# Patient Record
Sex: MALE | Race: WHITE | URBAN - METROPOLITAN AREA
[De-identification: names, ages, dates, MRNs, and addresses within clinical notes are randomized per-mention and may not be internally consistent; named-entity substitution may affect disease eponyms.]

---

## 2017-02-07 ENCOUNTER — TRANSFERRED RECORDS (OUTPATIENT)
Dept: HEALTH INFORMATION MANAGEMENT | Facility: CLINIC | Age: 62
End: 2017-02-07

## 2017-03-22 DIAGNOSIS — I10 BENIGN ESSENTIAL HYPERTENSION: ICD-10-CM

## 2017-03-22 RX ORDER — LISINOPRIL 5 MG/1
TABLET ORAL
Qty: 90 TABLET | Refills: 0 | Status: SHIPPED | OUTPATIENT
Start: 2017-03-22 | End: 2017-06-30

## 2017-03-22 NOTE — TELEPHONE ENCOUNTER
lisinopril (PRINIVIL/ZESTRIL) 5 MG tablet        Last Written Prescription Date: 4/7/2016  Last Fill Quantity: 90, # refills: 3  Last Office Visit with G, P or [facility] prescribing provider: 11/17/2016       Potassium   Date Value Ref Range Status   03/31/2016 4.1 3.4 - 5.3 mmol/L Final     Creatinine   Date Value Ref Range Status   03/31/2016 0.85 0.66 - 1.25 mg/dL Final     BP Readings from Last 3 Encounters:   11/17/16 136/58   04/07/16 133/54   01/15/15 128/71

## 2017-04-13 ENCOUNTER — TRANSFERRED RECORDS (OUTPATIENT)
Dept: HEALTH INFORMATION MANAGEMENT | Facility: CLINIC | Age: 62
End: 2017-04-13

## 2017-04-21 DIAGNOSIS — C61 PROSTATE CANCER (H): ICD-10-CM

## 2017-04-21 RX ORDER — TAMSULOSIN HYDROCHLORIDE 0.4 MG/1
CAPSULE ORAL
Qty: 30 CAPSULE | Refills: 5 | Status: SHIPPED | OUTPATIENT
Start: 2017-04-21 | End: 2017-08-17

## 2017-04-21 NOTE — TELEPHONE ENCOUNTER
Prescription approved per [facility] Refill Protocol.    Mary Mohan RN

## 2017-04-21 NOTE — TELEPHONE ENCOUNTER
Flomax 0.4 mg          Last Written Prescription Date: 4/7/16  Last Fill Quantity: 90, # refills: 3    Last Office Visit with G, P or [facility] prescribing provider:  11/17/16   Future Office Visit:      BP Readings from Last 3 Encounters:   11/17/16 136/58   04/07/16 133/54   01/15/15 128/71

## 2017-06-30 DIAGNOSIS — I10 BENIGN ESSENTIAL HYPERTENSION: ICD-10-CM

## 2017-06-30 NOTE — TELEPHONE ENCOUNTER
Please schedule Physical. Was not scheduled with last note 4/12/17.  TC left message for pt to schedule.  Route back to triage when scheduled.  Kristie Kam RN

## 2017-06-30 NOTE — TELEPHONE ENCOUNTER
Pending Prescriptions:                       Disp   Refills    lisinopril (PRINIVIL/ZESTRIL) 5 MG tablet*90 tab*0            Sig: TAKE ONE TABLET BY MOUTH ONE TIME DAILY. FASTING           APPOINTMENT FOR FURTHER REFILLS NEEDED.          Last Written Prescription Date: 3/22/17  Last Fill Quantity: 90, # refills: 0  Last Office Visit with [facility] or [facility] prescribing provider: 11/17/16       Potassium   Date Value Ref Range Status   03/31/2016 4.1 3.4 - 5.3 mmol/L Final     Creatinine   Date Value Ref Range Status   03/31/2016 0.85 0.66 - 1.25 mg/dL Final     BP Readings from Last 3 Encounters:   11/17/16 136/58   04/07/16 133/54   01/15/15 128/71

## 2017-07-13 RX ORDER — LISINOPRIL 5 MG/1
TABLET ORAL
Qty: 90 TABLET | Refills: 0 | Status: SHIPPED | OUTPATIENT
Start: 2017-07-13 | End: 2017-08-17

## 2017-07-13 NOTE — TELEPHONE ENCOUNTER
Patient called and is now scheduled for his annual Physical on 8-17  Please fill his Medication request    Thank you

## 2017-08-16 NOTE — PROGRESS NOTES
SUBJECTIVE:   CC: Chai Yuen is an 62 year old male who presents for preventative health visit.     The patient is doing well and works out very reg, biking.  He has reg follow up for active surveilance for cap and no issues. Dr Bernardo and Dr. Pugh, seeing the latter soon and psa done there.  He has not been checking blood pressure reg.  Had fall months ago and hurt left shoulder, now mostly resolved, but in bed on left side can feel it, otherwise no issues.  NO other c/o.    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      Healthy Habits:    Do you get at least three servings of calcium containing foods daily (dairy, green leafy vegetables, etc.)no yes    Amount of exercise or daily activities, outside of work: 6 day(s) per week    Problems taking medications regularly No    Medication side effects: No    Have you had an eye exam in the past two years? yes    Do you see a dentist twice per year? yes    Do you have sleep apnea, excessive snoring or daytime drowsiness?no              Today's PHQ-2 Score: PHQ-2 ( 1999 [employer]) 4/7/2016 1/15/2015   Q1: Little interest or pleasure in doing things 0 0   Q2: Feeling down, depressed or hopeless 0 0   PHQ-2 Score 0 0         Abuse: Current or Past(Physical, Sexual or Emotional)- No  Do you feel safe in your environment - Yes  Social History   Substance Use Topics     Smoking status: Never Smoker     Smokeless tobacco: Never Used     Alcohol use 4.2 oz/week     7 Standard drinks or equivalent per week     The patient does not drink >3 drinks per day nor >7 drinks per week.                Past Medical History:      Past Medical History:   Diagnosis Date     Anxiety 1/15     Aortic insufficiency     Moderate by echo 2013, fu 1 year, fu 4/16 no change     Ascending aorta dilatation (H)     fu stable 2013, fu 4/16 stable     Erectile dysfunction      GERD (gastroesophageal reflux disease)      HTN (hypertension)      [place] of [place] 4/[place]    [place]      Prostate cancer (H) 2010    Dr. Pugh and Dr. Bernardo, fu bx done 12/14 and stable     Shingles              Past Surgical History:      Past Surgical History:   Procedure Laterality Date     APPENDECTOMY  13             Social History:     Social History     Social History     Marital status:      Spouse name: N/A     Number of children: 3     Years of education: N/A     Occupational History      [employer]     Social History Main Topics     Smoking status: Never Smoker     Smokeless tobacco: Never Used     Alcohol use 4.2 oz/week     7 Standard drinks or equivalent per week     Drug use: No     Sexual activity: Yes     Partners: Female     Other Topics Concern     Not on file     Social History Narrative             Family History:   reviewed         Allergies:   No Known Allergies          Medications:     Current Outpatient Prescriptions   Medication Sig Dispense Refill     lisinopril (PRINIVIL/ZESTRIL) 5 MG tablet Take 1 tablet (5 mg) by mouth daily 90 tablet 3     tamsulosin (FLOMAX) 0.4 MG capsule Take 1 capsule (0.4 mg) by mouth daily 90 capsule 3     dutasteride (AVODART) 0.5 MG capsule Take 1 capsule (0.5 mg) by mouth daily 90 capsule 3     tadalafil (CIALIS) 20 MG tablet Take 0.5-1 tablets (10-20 mg) by mouth daily as needed for erectile dysfunction Never use with nitroglycerin, terazosin or doxazosin. 12 tablet 11     [DISCONTINUED] lisinopril (PRINIVIL/ZESTRIL) 5 MG tablet TAKE ONE TABLET BY MOUTH ONE TIME DAILY. FASTING APPOINTMENT FOR FURTHER REFILLS NEEDED. 90 tablet 0     [DISCONTINUED] tadalafil (CIALIS) 20 MG tablet Take 0.5-1 tablets (10-20 mg) by mouth daily as needed for erectile dysfunction Never use with nitroglycerin, terazosin or doxazosin. 12 tablet 11               Review of Systems:   The 10 point Review of Systems is negative other than noted in the HPI           Physical Exam:   Blood pressure 141/66, pulse 69, temperature 98.2  F (36.8  C), temperature source Oral,  "height 5' 11\" (1.803 m), weight 178 lb (80.7 kg), SpO2 96 %.    Exam:  Constitutional: healthy appearing, alert and in no distress  Heent: Normocephalic. Head without obvious masses or lesions. PERRLDC, EOMI. Mouth exam within normal limits: tongue, mucous membranes, posterior pharynx all normal, no lesions or abnormalities seen.  Tm's and canals within normal limits bilaterally. Neck supple, no nuchal rigidity or masses. No supraclavicular, or cervical adenopathy. Thyroid symmetric, no masses.  Cardiovascular: Regular rate and rhythm, no murmer, rub or gallops.  JVP not elevated, no edema.  Carotids within normal limits bilaterally, no bruits.  Respiratory: Normal respiratory effort.  Lungs clear, normal flow, no wheezing or crackles.  Breasts: Normal bilaterally.  No masses or lesions.  Nipples within normal limites.  No axillary lesions or nodes.  Gastrointestinal: Normal active bowel sounds.   Soft, not tender, no masses, guarding or rebound.  No hepatosplenomegaly.   Genitourinary: Rectal not done  Musculoskeletal: extremities normal, no gross deformities noted.  Skin: no suspicious lesions or rashes   Neurologic: Mental status within normal limits.  Speech fluent.  No gross motor abnormalities and gait intact.  Psychiatric: mentation appears normal and affect normal.         Data:   Labs patient did not want any today        Assessment:   1. Normal cpx  2. Cap, follow up onc and uro  3. Dilated asc aorta, to get follow up echo, needs better blood pressure control, he will check it and if not staying below 135/85 let me know, could consider bblocker then  4. Aortic insuff, follow up echo to be done  5. Hypertension, as noted above  6. E.d  7. hcm         Plan:   Up to date immunizations, colon  Exercise, diet  No weight lifting or bearing down  Echo  Monitor blood pressure and if not under 135/85 call      Mario Washington M.D.        "

## 2017-08-16 NOTE — PATIENT INSTRUCTIONS
Monitor your blood pressure and if it is not staying on average below 135/80 let me know.    Mario Washington M.D.            Preventive Health Recommendations  Male Ages 50 - 64    Yearly exam:             See your health care provider every year in order to  o   Review health changes.   o   Discuss preventive care.    o   Review your medicines if your doctor has prescribed any.     Have a cholesterol test every 5 years, or more frequently if you are at risk for high cholesterol/heart disease.     Have a diabetes test (fasting glucose) every three years. If you are at risk for diabetes, you should have this test more often.     Have a colonoscopy at age 50, or have a yearly FIT test (stool test). These exams will check for colon cancer.      Talk with your health care provider about whether or not a prostate cancer screening test (PSA) is right for you.    You should be tested each year for STDs (sexually transmitted diseases), if you re at risk.     Shots: Get a flu shot each year. Get a tetanus shot every 10 years.     Nutrition:    Eat at least 5 servings of fruits and vegetables daily.     Eat whole-grain bread, whole-wheat pasta and brown rice instead of white grains and rice.     Talk to your provider about Calcium and Vitamin D.     Lifestyle    Exercise for at least 150 minutes a week (30 minutes a day, 5 days a week). This will help you control your weight and prevent disease.     Limit alcohol to one drink per day.     No smoking.     Wear sunscreen to prevent skin cancer.     See your dentist every six months for an exam and cleaning.     See your eye doctor every 1 to 2 years.

## 2017-08-17 ENCOUNTER — OFFICE VISIT (OUTPATIENT)
Dept: FAMILY MEDICINE | Facility: CLINIC | Age: 62
End: 2017-08-17
Payer: COMMERCIAL

## 2017-08-17 VITALS
BODY MASS INDEX: 24.92 KG/M2 | SYSTOLIC BLOOD PRESSURE: 141 MMHG | HEIGHT: 71 IN | TEMPERATURE: 98.2 F | OXYGEN SATURATION: 96 % | WEIGHT: 178 LBS | DIASTOLIC BLOOD PRESSURE: 66 MMHG | HEART RATE: 69 BPM

## 2017-08-17 DIAGNOSIS — I10 BENIGN ESSENTIAL HYPERTENSION: ICD-10-CM

## 2017-08-17 DIAGNOSIS — C61 PROSTATE CANCER (H): ICD-10-CM

## 2017-08-17 DIAGNOSIS — I77.810 ASCENDING AORTA DILATATION (H): ICD-10-CM

## 2017-08-17 DIAGNOSIS — I35.1 NONRHEUMATIC AORTIC VALVE INSUFFICIENCY: ICD-10-CM

## 2017-08-17 DIAGNOSIS — N52.9 ERECTILE DYSFUNCTION, UNSPECIFIED ERECTILE DYSFUNCTION TYPE: ICD-10-CM

## 2017-08-17 DIAGNOSIS — Z00.00 ROUTINE GENERAL MEDICAL EXAMINATION AT A HEALTH CARE FACILITY: Primary | ICD-10-CM

## 2017-08-17 PROCEDURE — 99396 PREV VISIT EST AGE 40-64: CPT | Performed by: INTERNAL MEDICINE

## 2017-08-17 RX ORDER — DUTASTERIDE 0.5 MG/1
0.5 CAPSULE, LIQUID FILLED ORAL DAILY
Qty: 90 CAPSULE | Refills: 3 | Status: SHIPPED | OUTPATIENT
Start: 2017-08-17

## 2017-08-17 RX ORDER — TAMSULOSIN HYDROCHLORIDE 0.4 MG/1
0.4 CAPSULE ORAL DAILY
Qty: 90 CAPSULE | Refills: 3 | Status: SHIPPED | OUTPATIENT
Start: 2017-08-17 | End: 2017-12-07

## 2017-08-17 RX ORDER — LISINOPRIL 5 MG/1
5 TABLET ORAL DAILY
Qty: 90 TABLET | Refills: 3 | Status: SHIPPED | OUTPATIENT
Start: 2017-08-17 | End: 2017-12-07

## 2017-08-17 RX ORDER — TADALAFIL 20 MG/1
10-20 TABLET ORAL DAILY PRN
Qty: 12 TABLET | Refills: 11 | Status: SHIPPED | OUTPATIENT
Start: 2017-08-17

## 2017-08-17 NOTE — MR AVS SNAPSHOT
After Visit Summary   8/17/2017    Chai Yuen    MRN: [id number]           Patient Information     Date Of Birth          1955        Visit Information        Provider Department      8/17/2017 3:30 PM Mario Washington MD [facility]        Today's Diagnoses     Routine general medical examination at a health care facility    -  1    Benign essential hypertension        Prostate cancer        HTN (hypertension)        Erectile dysfunction        Prostate cancer (H)        Erectile dysfunction, unspecified erectile dysfunction type          Care Instructions    Monitor your blood pressure and if it is not staying on average below 135/80 let me know.    Mario Washington M.D.            Preventive Health Recommendations  Male Ages 50 - 64    Yearly exam:             See your health care provider every year in order to  o   Review health changes.   o   Discuss preventive care.    o   Review your medicines if your doctor has prescribed any.     Have a cholesterol test every 5 years, or more frequently if you are at risk for high cholesterol/heart disease.     Have a diabetes test (fasting glucose) every three years. If you are at risk for diabetes, you should have this test more often.     Have a colonoscopy at age 50, or have a yearly FIT test (stool test). These exams will check for colon cancer.      Talk with your health care provider about whether or not a prostate cancer screening test (PSA) is right for you.    You should be tested each year for STDs (sexually transmitted diseases), if you re at risk.     Shots: Get a flu shot each year. Get a tetanus shot every 10 years.     Nutrition:    Eat at least 5 servings of fruits and vegetables daily.     Eat whole-grain bread, whole-wheat pasta and brown rice instead of white grains and rice.     Talk to your provider about Calcium and Vitamin D.     Lifestyle    Exercise for at least 150 minutes a week (30 minutes a day, 5 days a  "week). This will help you control your weight and prevent disease.     Limit alcohol to one drink per day.     No smoking.     Wear sunscreen to prevent skin cancer.     See your dentist every six months for an exam and cleaning.     See your eye doctor every 1 to 2 years.            Follow-ups after your visit        Your next 10 appointments already scheduled     Aug 17, 2017  3:30 PM CDT   [facility]    [address] [facility] [address] [phone]              Who to contact     If you have questions or need follow up information about today's clinic visit or your schedule please contact [facility] directly at [phone].  Normal or non-critical lab and imaging results will be communicated to you by MyChart, letter or phone within 4 business days after the clinic has received the results. If you do not hear from us within 7 days, please contact the clinic through MyChart or phone. If you have a critical or abnormal lab result, we will notify you by phone as soon as possible.  Submit refill requests through [email or web address] or call your pharmacy and they will forward the refill request to us. Please allow 3 business days for your refill to be completed.          Additional Information About Your Visit        [employer] Information     [email or web address] lets you send messages to your doctor, view your test results, renew your prescriptions, schedule appointments and more. To sign up, go to [email or web address] . Click on \"Log in\" on the left side of the screen, which will take you to the Welcome page. Then click on \"Sign up Now\" on the right side of the page.     You will be asked to enter the access code listed below, as well as some personal information. Please follow the directions to create your username and password.     Your access code is: [email or web address]  Expires: 11/15/2017  3:25 PM     Your access code will  in 90 " "days. If you need help or a new code, please call your [facility] clinic or [phone].        Care EveryWhere ID     This is your Care EveryWhere ID. This could be used by other organizations to access your [facility] medical records  [id number]        Your Vitals Were     Pulse Temperature Height Pulse Oximetry BMI (Body Mass Index)       69 98.2  F (36.8  C) (Oral) 5' 11\" (1.803 m) 96% 24.83 kg/m2        Blood Pressure from Last 3 Encounters:   08/17/17 141/66   11/17/16 136/58   04/07/16 133/54    Weight from Last 3 Encounters:   08/17/17 178 lb (80.7 kg)   11/17/16 178 lb (80.7 kg)   04/07/16 180 lb (81.6 kg)              Today, you had the following     No orders found for display       Primary Care Provider Office Phone # Fax #    Mario Washington [phone] [address] [address]        Equal Access to Services     [facility]: Hadii aad ku hadasho Sojuany, waaxda luqadaha, qaybta kaalmada ary, pari tan . So [facility] [phone].    ATENCIÓN: Si habla español, tiene a floyd disposición servicios gratuitos de asistencia lingüística. Llame al [phone].    We comply with applicable federal civil rights laws and [place] laws. We do not discriminate on the basis of race, color, national origin, age, disability sex, sexual orientation or gender identity.            Thank you!     Thank you for choosing [facility]  for your care. Our goal is always to provide you with excellent care. Hearing back from our patients is one way we can continue to improve our services. Please take a few minutes to complete the written survey that you may receive in the mail after your visit with us. Thank you!             Your Updated Medication List - Protect others around you: Learn how to safely use, store and throw away your medicines at www.disposemymeds.org.          This list is accurate as of: 8/17/17  3:26 PM.  Always use your " most recent med list.                   Brand Name Dispense Instructions for use Diagnosis    dutasteride 0.5 MG capsule    AVODART     Take 0.5 mg by mouth daily.    HTN (hypertension), Erectile dysfunction, Prostate cancer (H)       lisinopril 5 MG tablet    PRINIVIL/ZESTRIL    90 tablet    TAKE ONE TABLET BY MOUTH ONE TIME DAILY. FASTING APPOINTMENT FOR FURTHER REFILLS NEEDED.    Benign essential hypertension       tadalafil 20 MG tablet    CIALIS    12 tablet    Take 0.5-1 tablets (10-20 mg) by mouth daily as needed for erectile dysfunction Never use with nitroglycerin, terazosin or doxazosin.    Erectile dysfunction, unspecified erectile dysfunction type       tamsulosin 0.4 MG capsule    FLOMAX    30 capsule    TAKE ONE CAPSULE BY MOUTH ONE TIME DAILY NEED FASTING APPOINTMENT FOR FURTHER REFILLS    Prostate cancer (H)

## 2017-08-17 NOTE — NURSING NOTE
"Chief Complaint   Patient presents with     Physical       Initial /66  Pulse 69  Temp 98.2  F (36.8  C) (Oral)  Ht 5' 11\" (1.803 m)  Wt 178 lb (80.7 kg)  SpO2 96%  BMI 24.83 kg/m2 Estimated body mass index is 24.83 kg/(m^2) as calculated from the following:    Height as of this encounter: 5' 11\" (1.803 m).    Weight as of this encounter: 178 lb (80.7 kg).  Medication Reconciliation: ike LEWIS CMA      "

## 2017-10-02 DIAGNOSIS — I10 BENIGN ESSENTIAL HYPERTENSION: ICD-10-CM

## 2017-10-02 NOTE — TELEPHONE ENCOUNTER
Refills available at pharmacy      lisinopril (PRINIVIL/ZESTRIL) 5 MG tablet      Last Written Prescription Date: 8/17/17  Last Fill Quantity: 90, # refills: 3  Last Office Visit with G, P or [facility] prescribing provider: 8/17/17       Potassium   Date Value Ref Range Status   03/31/2016 4.1 3.4 - 5.3 mmol/L Final     Creatinine   Date Value Ref Range Status   03/31/2016 0.85 0.66 - 1.25 mg/dL Final     BP Readings from Last 3 Encounters:   08/17/17 141/66   11/17/16 136/58   04/07/16 133/54           Jazlyn DEMPSEY(R)

## 2017-10-03 RX ORDER — LISINOPRIL 5 MG/1
TABLET ORAL
Qty: 90 TABLET | Refills: 2 | Status: SHIPPED | OUTPATIENT
Start: 2017-10-03 | End: 2017-12-14 | Stop reason: ALTCHOICE

## 2017-10-18 ENCOUNTER — TRANSFERRED RECORDS (OUTPATIENT)
Dept: HEALTH INFORMATION MANAGEMENT | Facility: CLINIC | Age: 62
End: 2017-10-18

## 2017-11-07 ENCOUNTER — HOSPITAL ENCOUNTER (OUTPATIENT)
Dept: CARDIOLOGY | Facility: CLINIC | Age: 62
Discharge: HOME | End: 2017-11-07
Attending: INTERNAL MEDICINE | Admitting: INTERNAL MEDICINE
Payer: COMMERCIAL

## 2017-11-07 DIAGNOSIS — I35.1 NONRHEUMATIC AORTIC VALVE INSUFFICIENCY: ICD-10-CM

## 2017-11-07 DIAGNOSIS — I77.810 ASCENDING AORTA DILATATION (H): ICD-10-CM

## 2017-11-07 PROCEDURE — 93306 TTE W/DOPPLER COMPLETE: CPT | Mod: 26 | Performed by: INTERNAL MEDICINE

## 2017-11-07 PROCEDURE — 93306 TTE W/DOPPLER COMPLETE: CPT

## 2017-11-08 ENCOUNTER — TELEPHONE (OUTPATIENT)
Dept: FAMILY MEDICINE | Facility: CLINIC | Age: 62
End: 2017-11-08

## 2017-11-08 DIAGNOSIS — I35.1 NONRHEUMATIC AORTIC VALVE INSUFFICIENCY: Primary | ICD-10-CM

## 2017-11-08 DIAGNOSIS — I77.810 ASCENDING AORTA DILATATION (H): ICD-10-CM

## 2017-11-08 NOTE — TELEPHONE ENCOUNTER
I called patient regarding echo.  Patient will see cards for this given the changes on the echo.    Mario Washington M.D.

## 2017-11-19 DIAGNOSIS — C61 PROSTATE CANCER (H): ICD-10-CM

## 2017-11-21 RX ORDER — TAMSULOSIN HYDROCHLORIDE 0.4 MG/1
CAPSULE ORAL
Qty: 90 CAPSULE | Refills: 2 | Status: SHIPPED | OUTPATIENT
Start: 2017-11-21 | End: 2018-09-27

## 2017-12-07 ENCOUNTER — OFFICE VISIT (OUTPATIENT)
Dept: CARDIOLOGY | Facility: CLINIC | Age: 62
End: 2017-12-07
Attending: INTERNAL MEDICINE
Payer: COMMERCIAL

## 2017-12-07 VITALS
BODY MASS INDEX: 26.18 KG/M2 | HEART RATE: 62 BPM | DIASTOLIC BLOOD PRESSURE: 55 MMHG | WEIGHT: 187 LBS | SYSTOLIC BLOOD PRESSURE: 155 MMHG | HEIGHT: 71 IN

## 2017-12-07 DIAGNOSIS — I35.1 NONRHEUMATIC AORTIC VALVE INSUFFICIENCY: Primary | ICD-10-CM

## 2017-12-07 DIAGNOSIS — I10 BENIGN ESSENTIAL HYPERTENSION: ICD-10-CM

## 2017-12-07 DIAGNOSIS — I77.810 ASCENDING AORTA DILATATION (H): ICD-10-CM

## 2017-12-07 PROCEDURE — 93000 ELECTROCARDIOGRAM COMPLETE: CPT | Performed by: INTERNAL MEDICINE

## 2017-12-07 PROCEDURE — 99204 OFFICE O/P NEW MOD 45 MIN: CPT | Performed by: INTERNAL MEDICINE

## 2017-12-07 NOTE — PROGRESS NOTES
HPI and Plan:   See dictation:[id number]    Orders Placed This Encounter   Procedures     Follow-Up with Cardiologist     EKG 12-lead complete w/read - Clinics (performed today)     Echocardiogram       No orders of the defined types were placed in this encounter.      Medications Discontinued During This Encounter   Medication Reason     lisinopril (PRINIVIL/ZESTRIL) 5 MG tablet Medication Reconciliation Clean Up     tamsulosin (FLOMAX) 0.4 MG capsule Medication Reconciliation Clean Up         Encounter Diagnoses   Name Primary?     Benign essential hypertension      Ascending aorta dilatation (H) Yes       CURRENT MEDICATIONS:  Current Outpatient Prescriptions   Medication Sig Dispense Refill     tamsulosin (FLOMAX) 0.4 MG capsule TAKE ONE CAPSULE BY MOUTH ONE TIME DAILY need fasting appointment 90 capsule 2     lisinopril (PRINIVIL/ZESTRIL) 5 MG tablet TAKE ONE TABLET BY MOUTH ONE TIME DAILY fasting appt for further refills needed 90 tablet 2     dutasteride (AVODART) 0.5 MG capsule Take 1 capsule (0.5 mg) by mouth daily 90 capsule 3     tadalafil (CIALIS) 20 MG tablet Take 0.5-1 tablets (10-20 mg) by mouth daily as needed for erectile dysfunction Never use with nitroglycerin, terazosin or doxazosin. 12 tablet 11     [DISCONTINUED] lisinopril (PRINIVIL/ZESTRIL) 5 MG tablet Take 1 tablet (5 mg) by mouth daily 90 tablet 3       ALLERGIES   No Known Allergies    PAST MEDICAL HISTORY:  Past Medical History:   Diagnosis Date     Anxiety 1/15     Aortic insufficiency     Moderate by echo 2013, fu 1 year, fu 4/16 no change     Ascending aorta dilatation (H)     fu stable 2013, fu 4/16 stable     Erectile dysfunction      GERD (gastroesophageal reflux disease)      HTN (hypertension)      [place] of [place] 4[place]    [place] [facility]     Prostate cancer (H) 2010    Dr. Pugh and Dr. Bernardo, fu bx done 12/14 and stable     Shingles        PAST SURGICAL HISTORY:  Past Surgical History:   Procedure Laterality Date     APPENDECTOMY  13  "      FAMILY HISTORY:  Family History   Problem Relation Age of Onset     Hypertension Mother      Heart Failure Father        SOCIAL HISTORY:  Social History     Social History     Marital status:      Spouse name: N/A     Number of children: 3     Years of education: N/A     Occupational History      [employer]     Social History Main Topics     Smoking status: Never Smoker     Smokeless tobacco: Never Used     Alcohol use 4.2 oz/week     7 Standard drinks or equivalent per week      Comment: occasional     Drug use: No     Sexual activity: Yes     Partners: Female     Other Topics Concern     Parent/Sibling W/ Cabg, Mi Or Angioplasty Before 65f 55m? No     Social History Narrative       Review of Systems:  Skin:  Negative       Eyes:  Negative      ENT:  Negative      Respiratory:  Negative       Cardiovascular:  Negative      Gastroenterology: Negative      Genitourinary:  not assessed      Musculoskeletal:  Negative      Neurologic:  Negative      Psychiatric:  Negative      Heme/Lymph/Imm:  Negative      Endocrine:  Negative        Physical Exam:  Vitals: /55 (BP Location: Right arm, Patient Position: Sitting, Cuff Size: Adult Large)  Pulse 62  Ht 1.803 m (5' 11\")  Wt 84.8 kg (187 lb)  BMI 26.08 kg/m2    Constitutional:  cooperative, alert and oriented, well developed, well nourished, in no acute distress        Skin:  warm and dry to the touch, no apparent skin lesions or masses noted          Head:  normocephalic, no masses or lesions        Eyes:  pupils equal and round, conjunctivae and lids unremarkable, sclera white, no xanthalasma, EOMS intact, no nystagmus        Lymph:      ENT:  no pallor or cyanosis, dentition good        Neck:  carotid pulses are full and equal bilaterally, JVP normal, no carotid bruit        Respiratory:  normal breath sounds, clear to auscultation, normal A-P diameter, normal symmetry, normal respiratory excursion, no use of accessory muscles     "     Cardiac: regular rhythm;normal S1 and S2;no S3 or S4;apical impulse not displaced           holodiastolic murmur;blowing;RUSB;grade 2    pulses full and equal, no bruits auscultated                                        GI:  abdomen soft, non-tender, BS normoactive, no mass, no HSM, no bruits        Extremities and Muscular Skeletal:  no deformities, clubbing, cyanosis, erythema observed;no edema              Neurological:  no gross motor deficits;affect appropriate        Psych:  Alert and Oriented x 3        CC  Mario Washington MD  [address]  [place] [address]

## 2017-12-07 NOTE — MR AVS SNAPSHOT
"              After Visit Summary   12/7/2017    Chai Yuen    MRN: [id number]           Patient Information     Date Of Birth          1955        Visit Information        Provider Department      12/7/2017 1:30 PM Tra Martinez MD [facility]        Today's Diagnoses     Ascending aorta dilatation (H)    -  1    Benign essential hypertension           Follow-ups after your visit        Additional Services     Follow-Up with Cardiologist                 Future tests that were ordered for you today     Open Future Orders        Priority Expected Expires Ordered    Follow-Up with Cardiologist Routine 12/7/2018 12/8/2018 12/7/2017    Echocardiogram Routine 12/7/2018 12/8/2018 12/7/2017            Who to contact     If you have questions or need follow up information about today's clinic visit or your schedule please contact [facility] directly at [phone].  Normal or non-critical lab and imaging results will be communicated to you by MyChart, letter or phone within 4 business days after the clinic has received the results. If you do not hear from us within 7 days, please contact the clinic through [facility] or phone. If you have a critical or abnormal lab result, we will notify you by phone as soon as possible.  Submit refill requests through [employer] or call your pharmacy and they will forward the refill request to us. Please allow 3 business days for your refill to be completed.          Additional Information About Your Visit        MyChart Information     [employer] lets you send messages to your doctor, view your test results, renew your prescriptions, schedule appointments and more. To sign up, go to [email or web address] . Click on \"Log in\" on the left side of the screen, which will take you to the Welcome page. Then click on \"Sign up Now\" on the right side of the page.     You will be asked to enter the access code " "listed below, as well as some personal information. Please follow the directions to create your username and password.     Your access code is: [email or web address]  Expires: 3/7/2018  2:25 PM     Your access code will  in 90 days. If you need help or a new code, please call your [place] clinic or [phone].        Care EveryWhere ID     This is your Care EveryWhere ID. This could be used by other organizations to access your [place] medical records  [id number]        Your Vitals Were     Pulse Height BMI (Body Mass Index)             62 1.803 m (5' 11\") 26.08 kg/m2          Blood Pressure from Last 3 Encounters:   17 155/55   17 141/66   16 136/58    Weight from Last 3 Encounters:   17 84.8 kg (187 lb)   17 80.7 kg (178 lb)   16 80.7 kg (178 lb)              We Performed the Following     EKG 12-lead complete w/read - Clinics (performed today)        Primary Care Provider Office Phone # Fax #    Mario Washington [phone] [address] [address]        Equal Access to Services     [facility] : Hadii aad ku hadasho Soomaali, waaxda luqadaha, qaybta kaalmada adeegyada, waxay portia tan . So [facility] [phone].    ATENCIÓN: Si habla español, tiene a floyd disposición servicios gratuitos de asistencia lingüística. Llame al [phone].    We comply with applicable federal civil rights laws and [place] laws. We do not discriminate on the basis of race, color, national origin, age, disability, sex, sexual orientation, or gender identity.            Thank you!     Thank you for choosing [facility]  for your care. Our goal is always to provide you with excellent care. Hearing back from our patients is one way we can continue to improve our services. Please take a few minutes to complete the written survey that you may receive in the mail after your visit with us. Thank " you!             Your Updated Medication List - Protect others around you: Learn how to safely use, store and throw away your medicines at www.disposemymeds.org.          This list is accurate as of: 12/7/17  2:25 PM.  Always use your most recent med list.                   Brand Name Dispense Instructions for use Diagnosis    dutasteride 0.5 MG capsule    AVODART    90 capsule    Take 1 capsule (0.5 mg) by mouth daily    Prostate cancer (H)       lisinopril 5 MG tablet    PRINIVIL/ZESTRIL    90 tablet    TAKE ONE TABLET BY MOUTH ONE TIME DAILY fasting appt for further refills needed    Benign essential hypertension       tadalafil 20 MG tablet    CIALIS    12 tablet    Take 0.5-1 tablets (10-20 mg) by mouth daily as needed for erectile dysfunction Never use with nitroglycerin, terazosin or doxazosin.    Erectile dysfunction, unspecified erectile dysfunction type       tamsulosin 0.4 MG capsule    FLOMAX    90 capsule    TAKE ONE CAPSULE BY MOUTH ONE TIME DAILY need fasting appointment    Prostate cancer (H)

## 2017-12-07 NOTE — LETTER
12/7/2017    Mario Washington MD  [address]  [place] [address]    RE: Chai Yuen       Dear Colleague,    I had the pleasure of seeing Chai Yuen in the [facility].    PRIMARY CARE PHYSICIAN:  Dr. Mario Washington.      HISTORY OF PRESENT ILLNESS:  I had the pleasure of seeing your patient, Chai Yuen, at [facility] for evaluation of aortic insufficiency.  I have known Ernesto Yuen most of our lives and I went to school with him throughout grade school and high school.  He is a delightful 62-year-old gentleman who was found to have hypertension approximately 2007.  He had an abnormal EKG and an echocardiogram was performed that also demonstrated mild to moderate aortic insufficiency.  Surveillance echocardiograms have been performed with the most recent on 11/07.  I interpreted that result and found that he had borderline to mild left ventricular enlargement at 5.6 cm.  This is compared to 5.5 cm 1 year ago.  Ejection fraction was normal at 55%-60%.  There was mild concentric left ventricular hypertrophy and grade I diastolic dysfunction.  There was moderate aortic regurgitation.  Mild mitral regurgitation.  The aortic valve was trileaflet and the aortic insufficiency jet was eccentric.  The ascending aorta was 3.8 cm which is only mild dilatation.  This is compared to 3.5 cm 1 year before.  The patient does not lift heavy weights.  He bikes 3000 miles per year.  He bikes during the summer and winter.  He has noted some increased dyspnea on exertion in the cold air.  He is a nonsmoker.  He does not have a history of myocardial infarction or diabetes.  He does not have known hyperlipidemia.  He denies a history of sleep apnea.  He tries to maintain a low-salt diet and is very careful about cooking at home and watching ingredients.  He lost some weight in 2007 and his blood pressure was improved.  He has gained some of that weight  back more recently.  He is not limited in his exercise.  He keeps a blood pressure journal and notes that his blood pressures generally are in the 130s to possibly 140s/60-70.      PHYSICAL EXAMINATION:  As listed below.     Outpatient Encounter Prescriptions as of 12/7/2017   Medication Sig Dispense Refill     tamsulosin (FLOMAX) 0.4 MG capsule TAKE ONE CAPSULE BY MOUTH ONE TIME DAILY need fasting appointment 90 capsule 2     lisinopril (PRINIVIL/ZESTRIL) 5 MG tablet TAKE ONE TABLET BY MOUTH ONE TIME DAILY fasting appt for further refills needed 90 tablet 2     dutasteride (AVODART) 0.5 MG capsule Take 1 capsule (0.5 mg) by mouth daily 90 capsule 3     tadalafil (CIALIS) 20 MG tablet Take 0.5-1 tablets (10-20 mg) by mouth daily as needed for erectile dysfunction Never use with nitroglycerin, terazosin or doxazosin. 12 tablet 11     [DISCONTINUED] lisinopril (PRINIVIL/ZESTRIL) 5 MG tablet Take 1 tablet (5 mg) by mouth daily 90 tablet 3     [DISCONTINUED] tamsulosin (FLOMAX) 0.4 MG capsule Take 1 capsule (0.4 mg) by mouth daily 90 capsule 3     No facility-administered encounter medications on file as of 12/7/2017.       ASSESSMENT:   1.  Chai Yuen is a delightful 62-year-old male with moderate aortic insufficiency, borderline left ventricular enlargement and mild concentric left ventricular hypertrophy.  The valve appears trileaflet.  For now the only treatment that is necessary is normalizing his blood pressure.  The patient's ejection fraction is well maintained.  We will monitor both left ventricular size and systolic function to help us decide when intervention on this valve is necessary.  I would repeat an echocardiogram in 1 year.  I would not restrict his activities other than avoiding heavy weightlifting.   2.  Systolic hypertension.  I would suggest maintaining his blood pressure in the 120s to no more than 135.  I would normally increase his lisinopril but the patient may be having a cough and I have  asked him to discuss options with Dr. Washington.  This can include changing him to an ARB.  I would suggest either irbesartan or olmesartan as they are the truest 24-hour ARB agents.   3.  The patient has a history of prostate cancer which is being treated conservatively for the time being.   4.  I have advised the patient to continue to eat a low-salt diet and continue with his exercise.      It is my pleasure to assist in the care of Chai Yuen.  I will plan on seeing him again in 1 year with an echocardiogram at that time.  He will call me for increased shortness of breath or arrhythmias.  He will follow up for his blood pressure with Dr. Washington.  All his questions were answered to his satisfaction.     Sincerely,    Tra Martinez MD     [facility]

## 2017-12-08 NOTE — PROGRESS NOTES
PRIMARY CARE PHYSICIAN:  Dr. Mario Washington.      HISTORY OF PRESENT ILLNESS:  I had the pleasure of seeing your patient, Chai Yuen, at [facility] for evaluation of aortic insufficiency.  I have known Ernesto Yuen most of our lives and I went to school with him throughout grade school and high school.  He is a delightful 62-year-old gentleman who was found to have hypertension approximately 2007.  He had an abnormal EKG and an echocardiogram was performed that also demonstrated mild to moderate aortic insufficiency.  Surveillance echocardiograms have been performed with the most recent on 11/07.  I interpreted that result and found that he had borderline to mild left ventricular enlargement at 5.6 cm.  This is compared to 5.5 cm 1 year ago.  Ejection fraction was normal at 55%-60%.  There was mild concentric left ventricular hypertrophy and grade I diastolic dysfunction.  There was moderate aortic regurgitation.  Mild mitral regurgitation.  The aortic valve was trileaflet and the aortic insufficiency jet was eccentric.  The ascending aorta was 3.8 cm which is only mild dilatation.  This is compared to 3.5 cm 1 year before.  The patient does not lift heavy weights.  He bikes 3000 miles per year.  He bikes during the summer and winter.  He has noted some increased dyspnea on exertion in the cold air.  He is a nonsmoker.  He does not have a history of myocardial infarction or diabetes.  He does not have known hyperlipidemia.  He denies a history of sleep apnea.  He tries to maintain a low-salt diet and is very careful about cooking at home and watching ingredients.  He lost some weight in 2007 and his blood pressure was improved.  He has gained some of that weight back more recently.  He is not limited in his exercise.  He keeps a blood pressure journal and notes that his blood pressures generally are in the 130s to possibly 140s/60-70.      PHYSICAL EXAMINATION:  As listed below.       ASSESSMENT:   1.  Chai Yuen is a delightful 62-year-old male with moderate aortic insufficiency, borderline left ventricular enlargement and mild concentric left ventricular hypertrophy.  The valve appears trileaflet.  For now the only treatment that is necessary is normalizing his blood pressure.  The patient's ejection fraction is well maintained.  We will monitor both left ventricular size and systolic function to help us decide when intervention on this valve is necessary.  I would repeat an echocardiogram in 1 year.  I would not restrict his activities other than avoiding heavy weightlifting.   2.  Systolic hypertension.  I would suggest maintaining his blood pressure in the 120s to no more than 135.  I would normally increase his lisinopril but the patient may be having a cough and I have asked him to discuss options with Dr. Washington.  This can include changing him to an ARB.  I would suggest either irbesartan or olmesartan as they are the truest 24-hour ARB agents.   3.  The patient has a history of prostate cancer which is being treated conservatively for the time being.   4.  I have advised the patient to continue to eat a low-salt diet and continue with his exercise.      It is my pleasure to assist in the care of Chai Yuen.  I will plan on seeing him again in 1 year with an echocardiogram at that time.  He will call me for increased shortness of breath or arrhythmias.  He will follow up for his blood pressure with Dr. Washington.  All his questions were answered to his satisfaction.      Nadege Denise MD       cc:   Mario Washington MD    [address]    [place] [address]         NADEGE DENISE MD, [facility]             D: 2017 14:36   T: 2017 19:18   MT: ROSANNA      Name:     CHAI YUEN   MRN:      [id number]        Account:      [id number]   :      1955           Service Date: 2017      Document: [id number]

## 2017-12-13 ENCOUNTER — TELEPHONE (OUTPATIENT)
Dept: FAMILY MEDICINE | Facility: CLINIC | Age: 62
End: 2017-12-13

## 2017-12-13 DIAGNOSIS — I10 BENIGN ESSENTIAL HYPERTENSION: Primary | ICD-10-CM

## 2017-12-13 RX ORDER — IRBESARTAN 300 MG/1
300 TABLET ORAL DAILY
Qty: 90 TABLET | Refills: 3 | Status: SHIPPED | OUTPATIENT
Start: 2017-12-13 | End: 2018-11-12

## 2017-12-13 NOTE — TELEPHONE ENCOUNTER
This is what Dr. Martinez wrote,      Systolic hypertension.  I would suggest maintaining his blood pressure in the 120s to no more than 135.  I would normally increase his lisinopril but the patient may be having a cough and I have asked him to discuss options with Dr. Washington.  This can include changing him to an ARB.  I would suggest either irbesartan or olmesartan as they are the truest 24-hour ARB agents.     Ely Oh, CMA

## 2017-12-13 NOTE — TELEPHONE ENCOUNTER
Reason for Call:  Med request     Detailed comments: Pt saw Cardiologist on 12/7   And he instructed pt to reach out to PCP to request a new medication   To replace the lisinopril ( pt is not sure the name of the medication)       [id number] - [place], [place] - [address]    Phone Number Patient can be reached at: Cell number on file:    Telephone Information:   Mobile [phone]       Best Time: anytime    Can we leave a detailed message on this number? YES    Call taken on 12/13/2017 at 10:57 AM by Rosita Meredith

## 2017-12-14 NOTE — TELEPHONE ENCOUNTER
New prescription sent to pharmacy, please have patient call if side effects or blood pressure not staying below 135/80    Mario Washington M.D.

## 2018-06-26 ENCOUNTER — TRANSFERRED RECORDS (OUTPATIENT)
Dept: HEALTH INFORMATION MANAGEMENT | Facility: CLINIC | Age: 63
End: 2018-06-26

## 2018-09-27 DIAGNOSIS — C61 PROSTATE CANCER (H): ICD-10-CM

## 2018-09-27 RX ORDER — TAMSULOSIN HYDROCHLORIDE 0.4 MG/1
CAPSULE ORAL
Qty: 90 CAPSULE | Refills: 0 | Status: SHIPPED | OUTPATIENT
Start: 2018-09-27 | End: 2018-12-29

## 2018-09-27 NOTE — TELEPHONE ENCOUNTER
"Routing refill request to provider for review/approval because:  Drug interaction warning  Patient needs to be seen because it has been more than 1 year since last office visit.  A 90 day supply is pended, patient is due for an office visit.  Please call to  assist the patient in scheduling an appointment.        Requested Prescriptions   Pending Prescriptions Disp Refills     tamsulosin (FLOMAX) 0.4 MG capsule [Pharmacy Med Name: Tamsulosin HCl Oral Capsule 0.4 MG] 90 capsule 1    Last Written Prescription Date:  11/21/2017  Last Fill Quantity: 90,  # refills: 2   Last office visit: 8/17/2017 with prescribing provider:  Padmini  Future Office Visit:     Sig: TAKE ONE CAPSULE BY MOUTH ONE TIME DAILY    Alpha Blockers Failed    9/27/2018 12:03 PM       Failed - Blood pressure under 140/90 in past 12 months    BP Readings from Last 3 Encounters:   12/07/17 155/55   08/17/17 141/66   11/17/16 136/58                Failed - Recent (12 mo) or future (30 days) visit within the authorizing provider's specialty    Patient had office visit in the last 12 months or has a visit in the next 30 days with authorizing provider or within the authorizing provider's specialty.  See \"Patient Info\" tab in inbasket, or \"Choose Columns\" in Meds & Orders section of the refill encounter.           Failed - Patient does not have Tadalafil, Vardenafil, or Sildenafil on their medication list       Passed - Patient is 18 years of age or older          Giselle TAYLOR RN  [employer] [facility]    "

## 2018-10-08 ENCOUNTER — TRANSFERRED RECORDS (OUTPATIENT)
Dept: HEALTH INFORMATION MANAGEMENT | Facility: CLINIC | Age: 63
End: 2018-10-08

## 2018-12-29 DIAGNOSIS — C61 PROSTATE CANCER (H): ICD-10-CM

## 2018-12-29 NOTE — TELEPHONE ENCOUNTER
"Requested Prescriptions   Pending Prescriptions Disp Refills     tamsulosin (FLOMAX) 0.4 MG capsule [Pharmacy Med Name: Tamsulosin HCl Oral Capsule 0.4 MG]  PATIENT NEEDS AN OFFICE VISIT.  Last Written Prescription Date:  9/27/18  Last Fill Quantity: 90 CAPSULE,  # refills: 0   Last office visit: 8/17/17 with prescribing provider:  CHRISTY   Future Office Visit:     90 capsule 0     Sig: TAKE ONE CAPSULE BY MOUTH ONE TIME DAILY    Alpha Blockers Failed - 12/29/2018 12:54 PM       Failed - Blood pressure under 140/90 in past 12 months    BP Readings from Last 3 Encounters:   12/07/17 155/55   08/17/17 141/66   11/17/16 136/58                Failed - Recent (12 mo) or future (30 days) visit within the authorizing provider's specialty    Patient had office visit in the last 12 months or has a visit in the next 30 days with authorizing provider or within the authorizing provider's specialty.  See \"Patient Info\" tab in inbasket, or \"Choose Columns\" in Meds & Orders section of the refill encounter.             Failed - Patient does not have Tadalafil, Vardenafil, or Sildenafil on their medication list       Passed - Patient is 18 years of age or older          "

## 2019-01-02 RX ORDER — TAMSULOSIN HYDROCHLORIDE 0.4 MG/1
CAPSULE ORAL
Qty: 30 CAPSULE | Refills: 0 | Status: SHIPPED | OUTPATIENT
Start: 2019-01-02 | End: 2019-02-09

## 2019-01-02 NOTE — TELEPHONE ENCOUNTER
Medication is being filled for 1 time refill only due to:  Patient needs to be seen because it has been more than one year since last visit.   Lissa LINDER RN

## 2019-02-09 DIAGNOSIS — C61 PROSTATE CANCER (H): ICD-10-CM

## 2019-02-11 NOTE — TELEPHONE ENCOUNTER
Please call pt to schedule office visit, then route back for fill  Was given 30 day supply previously  Kristie Kam RN

## 2019-02-11 NOTE — TELEPHONE ENCOUNTER
"tamsulosin (FLOMAX) 0.4 MG capsule  Last Written Prescription Date:  1/2/19  Last Fill Quantity: 30,  # refills: 0   Last office visit: 8/17/2017 with prescribing provider:  Padmini    Future Office Visit:          Requested Prescriptions   Pending Prescriptions Disp Refills     tamsulosin (FLOMAX) 0.4 MG capsule [Pharmacy Med Name: Tamsulosin HCl Oral Capsule 0.4 MG] 30 capsule 0     Sig: TAKE ONE CAPSULE BY MOUTH ONE TIME DAILY  1 MONTH ONLY-NEEDS APPT.    Alpha Blockers Failed - 2/9/2019 10:48 AM       Failed - Blood pressure under 140/90 in past 12 months    BP Readings from Last 3 Encounters:   12/07/17 155/55   08/17/17 141/66   11/17/16 136/58                Failed - Recent (12 mo) or future (30 days) visit within the authorizing provider's specialty    Patient had office visit in the last 12 months or has a visit in the next 30 days with authorizing provider or within the authorizing provider's specialty.  See \"Patient Info\" tab in inbasket, or \"Choose Columns\" in Meds & Orders section of the refill encounter.             Failed - Patient does not have Tadalafil, Vardenafil, or Sildenafil on their medication list       Passed - Medication is active on med list       Passed - Patient is 18 years of age or older          "

## 2019-02-13 RX ORDER — TAMSULOSIN HYDROCHLORIDE 0.4 MG/1
CAPSULE ORAL
Qty: 30 CAPSULE | Refills: 0 | Status: SHIPPED | OUTPATIENT
Start: 2019-02-13

## 2019-02-13 NOTE — TELEPHONE ENCOUNTER
Routing refill request to provider for review/approval because:  Alannah given x1 and patient did not follow up, please advise - call out to patient to notify due for OV     Gladys LEWIS RN

## 2021-03-14 ENCOUNTER — HEALTH MAINTENANCE LETTER (OUTPATIENT)
Age: 66
End: 2021-03-14

## 2021-10-24 ENCOUNTER — HEALTH MAINTENANCE LETTER (OUTPATIENT)
Age: 66
End: 2021-10-24

## 2022-04-10 ENCOUNTER — HEALTH MAINTENANCE LETTER (OUTPATIENT)
Age: 67
End: 2022-04-10

## 2022-10-15 ENCOUNTER — HEALTH MAINTENANCE LETTER (OUTPATIENT)
Age: 67
End: 2022-10-15